# Patient Record
Sex: FEMALE | Race: WHITE | Employment: UNEMPLOYED | URBAN - METROPOLITAN AREA
[De-identification: names, ages, dates, MRNs, and addresses within clinical notes are randomized per-mention and may not be internally consistent; named-entity substitution may affect disease eponyms.]

---
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
## 2024-04-22 ENCOUNTER — APPOINTMENT (OUTPATIENT)
Dept: GENERAL RADIOLOGY | Age: 19
End: 2024-04-22

## 2024-04-22 ENCOUNTER — APPOINTMENT (OUTPATIENT)
Dept: ULTRASOUND IMAGING | Age: 19
End: 2024-04-22

## 2024-04-22 ENCOUNTER — HOSPITAL ENCOUNTER (EMERGENCY)
Age: 19
Discharge: HOME | End: 2024-04-22

## 2024-04-22 VITALS
HEART RATE: 76 BPM | HEIGHT: 59 IN | RESPIRATION RATE: 16 BRPM | OXYGEN SATURATION: 98 % | DIASTOLIC BLOOD PRESSURE: 82 MMHG | TEMPERATURE: 98.1 F | WEIGHT: 150 LBS | SYSTOLIC BLOOD PRESSURE: 116 MMHG | BODY MASS INDEX: 30.24 KG/M2

## 2024-04-22 DIAGNOSIS — N83.201 BILATERAL OVARIAN CYSTS: ICD-10-CM

## 2024-04-22 DIAGNOSIS — Z20.2 POSSIBLE EXPOSURE TO STD: Primary | ICD-10-CM

## 2024-04-22 DIAGNOSIS — N83.202 BILATERAL OVARIAN CYSTS: ICD-10-CM

## 2024-04-22 DIAGNOSIS — R10.2 PELVIC PAIN: ICD-10-CM

## 2024-04-22 PROBLEM — M79.662 PAIN OF LEFT CALF: Status: ACTIVE | Noted: 2021-09-12

## 2024-04-22 PROBLEM — O26.899 HEADACHE IN PREGNANCY: Status: ACTIVE | Noted: 2021-06-28

## 2024-04-22 PROBLEM — O41.1230 CHORIOAMNIONITIS IN THIRD TRIMESTER: Status: ACTIVE | Noted: 2021-09-17

## 2024-04-22 PROBLEM — L30.9 ECZEMA: Status: ACTIVE | Noted: 2021-10-25

## 2024-04-22 PROBLEM — K03.2 DENTAL EROSION: Status: ACTIVE | Noted: 2018-04-09

## 2024-04-22 PROBLEM — R10.84 GENERALIZED ABDOMINAL PAIN: Status: ACTIVE | Noted: 2017-12-18

## 2024-04-22 PROBLEM — O47.00 PRETERM CONTRACTIONS: Status: ACTIVE | Noted: 2021-08-07

## 2024-04-22 PROBLEM — O99.820 GROUP B STREPTOCOCCAL CARRIAGE COMPLICATING PREGNANCY: Status: ACTIVE | Noted: 2021-08-11

## 2024-04-22 PROBLEM — F41.9 ANXIETY AND DEPRESSION: Status: ACTIVE | Noted: 2019-04-10

## 2024-04-22 PROBLEM — A74.9 CHLAMYDIAL INFECTION: Status: ACTIVE | Noted: 2024-04-22

## 2024-04-22 PROBLEM — O47.03 FALSE LABOR BEFORE 37 COMPLETED WEEKS OF GESTATION DURING PREGNANCY IN THIRD TRIMESTER, ANTEPARTUM: Status: ACTIVE | Noted: 2021-08-24

## 2024-04-22 PROBLEM — F32.A ANXIETY AND DEPRESSION: Status: ACTIVE | Noted: 2019-04-10

## 2024-04-22 PROBLEM — R51.9 HEADACHE IN PREGNANCY: Status: ACTIVE | Noted: 2021-06-28

## 2024-04-22 LAB
APPEARANCE UR: CLEAR
BACTERIA URNS QL MICRO: 0 /HPF
BILIRUB UR QL: NEGATIVE
CASTS URNS QL MICRO: 0 /LPF
COLOR UR: YELLOW
CRYSTALS URNS QL MICRO: 0 /LPF
EPI CELLS #/AREA URNS HPF: NORMAL /HPF
GLUCOSE UR STRIP.AUTO-MCNC: NEGATIVE MG/DL
HCG UR QL: NEGATIVE
HGB UR QL STRIP: ABNORMAL
KETONES UR QL STRIP.AUTO: NEGATIVE MG/DL
LEUKOCYTE ESTERASE UR QL STRIP.AUTO: NEGATIVE
MUCOUS THREADS URNS QL MICRO: 0 /LPF
NITRITE UR QL STRIP.AUTO: NEGATIVE
OTHER OBSERVATIONS: NORMAL
PH UR STRIP: 5.5 (ref 5–9)
PROT UR STRIP-MCNC: NEGATIVE MG/DL
RBC #/AREA URNS HPF: NORMAL /HPF
SERVICE CMNT-IMP: NORMAL
SP GR UR REFRACTOMETRY: 1.01 (ref 1–1.02)
UROBILINOGEN UR QL STRIP.AUTO: 0.2 EU/DL (ref 0.2–1)
WBC URNS QL MICRO: NORMAL /HPF
WET PREP GENITAL: NORMAL

## 2024-04-22 PROCEDURE — 76830 TRANSVAGINAL US NON-OB: CPT

## 2024-04-22 PROCEDURE — 87591 N.GONORRHOEAE DNA AMP PROB: CPT

## 2024-04-22 PROCEDURE — 6360000002 HC RX W HCPCS

## 2024-04-22 PROCEDURE — 81025 URINE PREGNANCY TEST: CPT

## 2024-04-22 PROCEDURE — 87210 SMEAR WET MOUNT SALINE/INK: CPT

## 2024-04-22 PROCEDURE — 81001 URINALYSIS AUTO W/SCOPE: CPT

## 2024-04-22 PROCEDURE — 96372 THER/PROPH/DIAG INJ SC/IM: CPT

## 2024-04-22 PROCEDURE — 2500000003 HC RX 250 WO HCPCS

## 2024-04-22 PROCEDURE — 99284 EMERGENCY DEPT VISIT MOD MDM: CPT

## 2024-04-22 PROCEDURE — 87491 CHLMYD TRACH DNA AMP PROBE: CPT

## 2024-04-22 PROCEDURE — 74019 RADEX ABDOMEN 2 VIEWS: CPT

## 2024-04-22 RX ORDER — DOXYCYCLINE HYCLATE 100 MG
100 TABLET ORAL 2 TIMES DAILY
Qty: 14 TABLET | Refills: 0 | Status: SHIPPED | OUTPATIENT
Start: 2024-04-22 | End: 2024-04-29

## 2024-04-22 RX ADMIN — CEFTRIAXONE SODIUM 500 MG: 500 INJECTION, POWDER, FOR SOLUTION INTRAMUSCULAR; INTRAVENOUS at 15:26

## 2024-04-22 ASSESSMENT — ENCOUNTER SYMPTOMS
SHORTNESS OF BREATH: 0
DIARRHEA: 0
VOMITING: 0
ABDOMINAL PAIN: 0
COLOR CHANGE: 0
NAUSEA: 0

## 2024-04-22 ASSESSMENT — LIFESTYLE VARIABLES
HOW OFTEN DO YOU HAVE A DRINK CONTAINING ALCOHOL: NEVER
HOW MANY STANDARD DRINKS CONTAINING ALCOHOL DO YOU HAVE ON A TYPICAL DAY: PATIENT DOES NOT DRINK

## 2024-04-22 ASSESSMENT — PAIN SCALES - GENERAL
PAINLEVEL_OUTOF10: 3
PAINLEVEL_OUTOF10: 6

## 2024-04-22 ASSESSMENT — PAIN - FUNCTIONAL ASSESSMENT: PAIN_FUNCTIONAL_ASSESSMENT: 0-10

## 2024-04-22 NOTE — ED PROVIDER NOTES
Emergency Department Provider Note       PCP: Joo Joyce MD   Age: 18 y.o.   Sex: female     DISPOSITION Decision To Discharge 04/22/2024 03:33:36 PM       ICD-10-CM    1. Possible exposure to STD  Z20.2 doxycycline hyclate (VIBRA-TABS) 100 MG tablet      2. Bilateral ovarian cysts  N83.201     N83.202       3. Pelvic pain  R10.2 doxycycline hyclate (VIBRA-TABS) 100 MG tablet          Medical Decision Making     Vital signs reviewed, patient stable, NAD, afebrile, nontoxic in appearance     Heart rate 75, O2 saturation 98% on room air, blood pressure 117/87    18-year-old female presents for 3 weeks of sharp pelvic pain.  Describes the pain is all throughout her pelvis.  Pain is sometimes all day long, sometimes intermittent.  Nothing makes it better or worse.  No treatments have been tried.  No fevers, no nausea, no vomiting, no diarrhea.  Denies vaginal discharge, denies painful or painless lesions on genitalia states she has a history of ovarian cysts therefore she was worried.  No longer has an IUD.  No longer has a gynecologist.  Denies any vaginal discharge.  Endorses unprotected sexual intercourse.  .  Physical exam is reassuring.  There is no significant tenderness to palpation throughout abdomen or pelvis, no rebound, no guarding.  Mucous membranes are moist.  No flank tenderness.  Lungs are clear to auscultation bilaterally    Will check a urine hCG, check a urinalysis  self swab wet prep, swell swab GC and chlamydia  Patient is agreeable to empiric treatment for gonorrhea and chlamydia    Will obtain flat and erect abdominal x-ray  Will obtain pelvic ultrasound as patient does have a history of ovarian cysts.  Patient is agreeable with this plan.  States she does not need anything for pain    Abdominal series negative for obstructive bowel gas pattern, no evidence of subdiaphragmatic free air.  Patient does have large stool burden    Pelvic ultrasound demonstrates small simple appearing cyst

## 2024-04-22 NOTE — DISCHARGE INSTRUCTIONS
Evaluated in the emergency department today for 3 weeks of pelvic pain.    Your physical exam is very reassuring without any emergent findings  Wet prep is negative for bacterial vaginosis, trichomonas, yeast infection  No sign of a urinary tract infection    You were treated with a shot of Rocephin to cover for gonorrhea  I have written you prescription for doxycycline to start today for to cover for chlamydia    No oral anal or vaginal sex for the next 10 days    Contact Dr. Madi Torres.  He has a gynecologist that has been assigned to you for follow-up    Contact [facility] [employer] to see if you qualify for free reduced cost health care    Recommend that you also contact the number listed below and they can help you find primary care regardless of insurance status    Ultrasound today shows multiple small cysts in both of your ovaries.  This is not uncommon.  This is nonemergent.  You have good blood flow to both of your ovaries    X-ray today shows large amount of stool within your colon.  This could be contributing to your discomfort.  Do recommend daily MiraLAX once per day as per instructions on the bottle    Return to the emergency department for chest pain, shortness of breath, signs and symptoms of stroke as listed in your discharge paperwork, heavy menstrual bleeding through a thick menstrual pad and under 30 minutes for more than 2 consecutive hours    We would love to help you get a primary care doctor for follow-up after your emergency department visit.    Please call [phone] between 7AM - 6PM Monday to Friday.  A care navigator will be able to assist you with setting up a doctor close to your home.

## 2024-04-22 NOTE — ED NOTES
Pt c/o right lower quadrant abd pain that began 3 weeks ago.  States that she has a hx of ovarian cysts.  States that the pain is so intense at times, it wakes her from her sleep
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
